# Patient Record
(demographics unavailable — no encounter records)

---

## 2024-11-01 NOTE — REASON FOR VISIT
[Home] : at home, [unfilled] , at the time of the visit. [Medical Office: ([facility])___] : at the medical office located in  [Patient] : the patient [Self] : self [Consultation] : a consultation visit

## 2024-11-01 NOTE — REASON FOR VISIT
[Home] : at home, [unfilled] , at the time of the visit. [Medical Office: ([facility])___] : at the medical office located in  [Patient] : the patient [Self] : self [Consultation] : a consultation visit

## 2024-11-01 NOTE — REASON FOR VISIT
[Home] : at home, [unfilled] , at the time of the visit. [Medical Office: ([facility])___] : at the medical office located in  [Patient] : the patient [Self] : self [Consultation] : a consultation visit

## 2024-11-04 NOTE — ASSESSMENT
[FreeTextEntry1] : I reviewed all her records.  At this stage I do believe the fact that she needs to be evaluated here with a MRI of the pelvis, Gastrografin enema and exam under anesthesia with flexible pouchoscopy to assess what recommendation I can give it to her.  Her #1 problem is stoma related issues with leakage where she changes every day.  At the same time she is also very interested about the possibility of a redo pouch.  She was told this was not a possibility and she had to have a en bloc pouch and uterus removal.  I do not think this is really needed.  At the same time I cannot be sure.  We will also have her to see Dr. Dodson (if she needs a redo pouch) when she is here to see our physician assistants before the surgery.  Risk benefits morbidity were explained she consents for the procedure. -GGE, MRI pelvis, and EUA/pouchoscopy for initial workup -Meet with stoma nurse while here in [place]
normal

## 2024-11-04 NOTE — HISTORY OF PRESENT ILLNESS
[Home] : at home, [unfilled] , at the time of the visit. 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 Cameron. She developed pouchitis "almost right away that was off and on" and developed a perirectal abscess 10 years after pouch surgery. Ex-lap in 2012  She states that 3 years ago she started having abdominal pain and went to ED and OBGYN. She had fibroids removed. She got an MRI and states that "it looked like I had dehiscence of j-pouch and they drained the pus in my pouch" She met with Dr. Mendez at . She was given an end ileostomy, but she still has an anus and pouch.  Her post op course was complicated by pouchitis requiring antibiotics ad recurrent perianal abscesses and fistulas. recent hospitalization at  for chronic leak from tip of the J  Her QOL is impacted by her ileostomy. She states that "the mouth is turned downward and leaks". She changes her bag every day or every other day.   She would like to entertain the idea of having a j-pouch redo.   5/10/2024 - Dx Lx MARIELENA, takedown small bowel fistula x 2, repair of top of J, small bowel resection, DLI (Dr. Corral) (pg 59/60) *Remains small bowel 400 cm total remaining with 275 proximal to the small bowel anastomosis and 125 distal to the small bowel anastomosis. DLI about 15cm proximal to the pouch  Surgical Pathology (05/10/24) - page 162/198 FINAL DIAGNOSIS A. Small bowel resection:  -segment of small bowel with chronic active enteritis, fistula tract, and acute and organizing serositis.  -negative for granulomatous inflammation and dysplasia  Medications Wellbutrin and Trazadone daily

## 2024-11-04 NOTE — PHYSICAL EXAM
[Alert] : alert [Oriented to Person] : oriented to person [Oriented to Place] : oriented to place [Oriented to Time] : oriented to time [Calm] : calm [de-identified] : +stoma [de-identified] : WDWEMMANUEL [de-identified] : ANICTERIC

## 2024-11-04 NOTE — HISTORY OF PRESENT ILLNESS
[Home] : at home, [unfilled] , at the time of the visit. 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 Cameron. She developed pouchitis "almost right away that was off and on" and developed a perirectal abscess 10 years after pouch surgery. Ex-lap in 2012  She states that 3 years ago she started having abdominal pain and went to ED and OBGYN. She had fibroids removed. She got an MRI and states that "it looked like I had dehiscence of j-pouch and they drained the pus in my pouch" She met with Dr. Mendez at . She was given an end ileostomy, but she still has an anus and pouch.  Her post op course was complicated by pouchitis requiring antibiotics ad recurrent perianal abscesses and fistulas. recent hospitalization at  for chronic leak from tip of the J  Her QOL is impacted by her ileostomy. She states that "the mouth is turned downward and leaks". She changes her bag every day or every other day.   She would like to entertain the idea of having a j-pouch redo.   5/10/2024 - Dx Lx MARIELENA, takedown small bowel fistula x 2, repair of top of J, small bowel resection, DLI (Dr. Corral) (pg 59/60) *Remains small bowel 400 cm total remaining with 275 proximal to the small bowel anastomosis and 125 distal to the small bowel anastomosis. DLI about 15cm proximal to the pouch  Surgical Pathology (05/10/24) - page 162/198 FINAL DIAGNOSIS A. Small bowel resection:  -segment of small bowel with chronic active enteritis, fistula tract, and acute and organizing serositis.  -negative for granulomatous inflammation and dysplasia  Medications Wellbutrin and Trazadone daily

## 2024-11-04 NOTE — PHYSICAL EXAM
[Alert] : alert [Oriented to Person] : oriented to person [Oriented to Place] : oriented to place [Oriented to Time] : oriented to time [Calm] : calm [de-identified] : +stoma [de-identified] : WDWEMMANUEL [de-identified] : ANICTERIC

## 2024-11-04 NOTE — PHYSICAL EXAM
[Alert] : alert [Oriented to Person] : oriented to person [Oriented to Place] : oriented to place [Oriented to Time] : oriented to time [Calm] : calm [de-identified] : +stoma [de-identified] : WDWEMMANUEL [de-identified] : ANICTERIC

## 2024-11-04 NOTE — HISTORY OF PRESENT ILLNESS
[Home] : at home, [unfilled] , at the time of the visit. 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 Cameron. She developed pouchitis "almost right away that was off and on" and developed a perirectal abscess 10 years after pouch surgery. Ex-lap in 2012  She states that 3 years ago she started having abdominal pain and went to ED and OBGYN. She had fibroids removed. She got an MRI and states that "it looked like I had dehiscence of j-pouch and they drained the pus in my pouch" She met with Dr. Mendez at . She was given an end ileostomy, but she still has an anus and pouch.  Her post op course was complicated by pouchitis requiring antibiotics ad recurrent perianal abscesses and fistulas. recent hospitalization at  for chronic leak from tip of the J  Her QOL is impacted by her ileostomy. She states that "the mouth is turned downward and leaks". She changes her bag every day or every other day.   She would like to entertain the idea of having a j-pouch redo.   5/10/2024 - Dx Lx MARIELENA, takedown small bowel fistula x 2, repair of top of J, small bowel resection, DLI (Dr. Corral) (pg 59/60) *Remains small bowel 400 cm total remaining with 275 proximal to the small bowel anastomosis and 125 distal to the small bowel anastomosis. DLI about 15cm proximal to the pouch  Surgical Pathology (05/10/24) - page 162/198 FINAL DIAGNOSIS A. Small bowel resection:  -segment of small bowel with chronic active enteritis, fistula tract, and acute and organizing serositis.  -negative for granulomatous inflammation and dysplasia  Medications Wellbutrin and Trazadone daily

## 2024-12-03 NOTE — HISTORY OF PRESENT ILLNESS
[FreeTextEntry1] : The patient is a 61 y/o F from [place] here today with her sister, Josiane for pre-op discussion for j-pouch excision  She states that during previous hospitalization   Her 4 month old grandson, Ashkan recently had heart surgery and is doing well.

## 2024-12-03 NOTE — ASSESSMENT
[FreeTextEntry1] : reviewed ucath  purpose to help identify ureters avoids injury and recognize injury if occurs  risk of bleeding infection damage to ureter  poss need for replacemnt

## 2024-12-03 NOTE — ASSESSMENT
[FreeTextEntry1] : -Discussed stents/epidural -Reviewed instructions for pre-surgical shake -Discussed low fiber 4-6 weeks post op -Discussed lifting restrictions 4-6 weeks post op -F/U with RD after surgery -Risks, benefits, and alternatives discussed with patient. Patient consents to surgery.  ELY

## 2024-12-03 NOTE — HISTORY OF PRESENT ILLNESS
[FreeTextEntry1] : The patient-doctor relationship has been established in a face to face fashion via real time video/audio HIPAA compliant communication using telemedicine software.  The patient's identity has been confirmed.  The patient was previously emailed a copy of the telemedicine consent.  They have had a chance to review and has now given verbal consent and has requested care to be assessed and treated through telemedicine and understands there maybe limitations in this process and they may need further follow up care in the office and or hospital settings.      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 Cameron. She developed pouchitis "almost right away that was off and on" and developed a perirectal abscess 10 years after pouch surgery. Ex-lap in 2012  She states that 3 years ago she started having abdominal pain and went to ED and OBGYN. She had fibroids removed. She got an MRI and states that "it looked like I had dehiscence of j-pouch and they drained the pus in my pouch" She met with Dr. Mendez at . She was given an end ileostomy, but she still has an anus and pouch. Her post op course was complicated by pouchitis requiring antibiotics ad recurrent perianal abscesses and fistulas. recent hospitalization at  for chronic leak from tip of the J  scheduked for pouch excision  consult for ucaths no voiding complaints  no prior gu surgery

## 2024-12-03 NOTE — HISTORY OF PRESENT ILLNESS
[FreeTextEntry1] : The patient-doctor relationship has been established in a face to face fashion via real time video/audio HIPAA compliant communication using telemedicine software.  The patient's identity has been confirmed.  The patient was previously emailed a copy of the telemedicine consent.  They have had a chance to review and has now given verbal consent and has requested care to be assessed and treated through telemedicine and understands there maybe limitations in this process and they may need further follow up care in the office and or hospital settings.      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 Cameron. She developed pouchitis "almost right away that was off and on" and developed a perirectal abscess 10 years after pouch surgery. Ex-lap in 2012  She states that 3 years ago she started having abdominal pain and went to ED and OBGYN. She had fibroids removed. She got an MRI and states that "it looked like I had dehiscence of j-pouch and they drained the pus in my pouch" She met with Dr. Mendez at . She was given an end ileostomy, but she still has an anus and pouch. Her post op course was complicated by pouchitis requiring antibiotics ad recurrent perianal abscesses and fistulas. recent hospitalization at  for chronic leak from tip of the J  scheduked for pouch excision  consult for ucaths no voiding complaints  no prior gu surgery

## 2025-01-03 NOTE — DATA REVIEWED
[FreeTextEntry1] : Final Diagnosis Small bowel pouch afferent limb and anus - Compatible with pouch with intact anastomoses, focal defect area without associated inflammatory reaction, and anus - Pouch featuring chronic inflammatory infiltrates, crypt distortion, acute inflammation, and erosion - Viable resection  margins; scattered crypt abscesses present at one resection margin - Negative for granulomas and neoplasm - Seven reactive lymph nodes Verified by: Theodore Eagle DO

## 2025-01-03 NOTE — PHYSICAL EXAM
[No Rash or Lesion] : No rash or lesion [Alert] : alert [Oriented to Person] : oriented to person [Oriented to Place] : oriented to place [Calm] : calm [de-identified] : +stoma [de-identified] : wdwn female, nad [de-identified] : nc/at [facility] [de-identified] : no c/c/e noted

## 2025-01-03 NOTE — HISTORY OF PRESENT ILLNESS
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 Her post op course was complicated by pouchitis requiring antibiotcs ad recurrent perianal abscesses and fistulas. recent hospitalization at  for chronic leak from tip of the J  5/10/2024 - Dx Lx MARIELENA, takedown small bowel fistula x 2, repair of top of J, small bowel resection, DLI (Dr. Corral) (pg 59/60) *Remains small bowel 400 cm total remaining with 275 proximal to the small bowel anastomosis and 125 distal to the small bowel anastomosis. DLI about 15cm proximal to the pouch  1/2/2025 She still has some pain sitting.  She did some pictures showing some opening of her wound. No fever. occasional chills. She is using tylenol and advil prn for her pain. she is taking pepcid with the ibuprofen.Her symptoms are worse at night. She does not want to take pain medication.  She did drive to the store and she is gets very tired.  She is maintaining her hydration. Her ostomy is functioning, she is eating protein. She had some pancaking - but overall doing ok. Issues are much better than previously.  She is having night sweats - sometimes. She does not feel that she has an infection.

## 2025-01-03 NOTE — ASSESSMENT
[FreeTextEntry1] : 61 yo female with hx of pouch excision doing well, with mild wound dehiscence - serosanginous drainage will follow up with referring CRS for in person wound check ok to take oxycodone prn in the evening for pain. Follow up with Dr. Ferguson

## 2025-01-03 NOTE — PHYSICAL EXAM
[No Rash or Lesion] : No rash or lesion [Alert] : alert [Oriented to Person] : oriented to person [Oriented to Place] : oriented to place [Calm] : calm [de-identified] : +stoma [de-identified] : wdwn female, nad [de-identified] : nc/at [facility] [de-identified] : no c/c/e noted

## 2025-02-07 NOTE — REASON FOR VISIT
[Home] : at home, [unfilled] , at the time of the visit. [Medical Office: ([facility])___] : at the medical office located in  [Telehealth (audio & video)] : This visit was provided via telehealth using real-time 2-way audio visual technology. [Verbal consent obtained from patient] : the patient, [unfilled] [Follow-Up: _____] : a [unfilled] follow-up visit

## 2025-02-07 NOTE — REASON FOR VISIT
[Home] : at home, [unfilled] , at the time of the visit. [Medical Office: ([facility])___] : at the medical office located in  [Telehealth (audio & video)] : This visit was provided via telehealth using real-time 2-way audio visual technology. [Verbal consent obtained from patient] : the patient, [unfilled] [Follow-Up: _____] : a [unfilled] follow-up visit

## 2025-02-10 NOTE — PHYSICAL EXAM
[Alert] : alert [Oriented to Person] : oriented to person [Oriented to Place] : oriented to place [Oriented to Time] : oriented to time [Calm] : calm [de-identified] : +stoma [de-identified] : WDWEMMANUEL [de-identified] : ANICTERIC

## 2025-02-10 NOTE — HISTORY OF PRESENT ILLNESS
[Home] : at home, [unfilled] , at the time of the visit. 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 Cameron. She developed pouchitis "almost right away that was off and on" and developed a perirectal abscess 10 years after pouch surgery. Ex-lap in 2012  She states that 3 years ago she started having abdominal pain and went to ED and OBGYN. She had fibroids removed. She got an MRI and states that "it looked like I had dehiscence of j-pouch and they drained the pus in my pouch" She met with Dr. Mendez at . She was given an end ileostomy, but she still has an anus and pouch.  Her post op course was complicated by pouchitis requiring antibiotics ad recurrent perianal abscesses and fistulas. recent hospitalization at  for chronic leak from tip of the J  Her QOL is impacted by her ileostomy. She states that "the mouth is turned downward and leaks". She changes her bag every day or every other day.   She would like to entertain the idea of having a j-pouch redo.   5/10/2024 - Dx Lx MARIELENA, takedown small bowel fistula x 2, repair of top of J, small bowel resection, DLI (Dr. Corral) (pg 59/60) *Remains small bowel 400 cm total remaining with 275 proximal to the small bowel anastomosis and 125 distal to the small bowel anastomosis. DLI about 15cm proximal to the pouch  Surgical Pathology (05/10/24) - page 162/198 FINAL DIAGNOSIS A. Small bowel resection:  -segment of small bowel with chronic active enteritis, fistula tract, and acute and organizing serositis.  -negative for granulomatous inflammation and dysplasia  Medications Wellbutrin and Trazadone daily  2/10/2025 Here for f/u appt s/p j-pouch excision and stoma revision 12/04/24 She states that she has a small portion of her perineal wound that is still healing.  LLQ pain that is "very low and pretty tender" She has had some pain with sexual intercourse. This was the first time she attempted intercourse in almost 2 years She is postmenopausal.  Denies fever, chills She is back at work

## 2025-02-10 NOTE — PHYSICAL EXAM
[Alert] : alert [Oriented to Person] : oriented to person [Oriented to Place] : oriented to place [Oriented to Time] : oriented to time [Calm] : calm [de-identified] : +stoma [de-identified] : WDWEMMANUEL [de-identified] : ANICTERIC

## 2025-02-10 NOTE — ASSESSMENT
[FreeTextEntry1] : I reviewed all her records.  At this stage I do believe the fact that she needs to be evaluated here with a MRI of the pelvis, Gastrografin enema and exam under anesthesia with flexible pouchoscopy to assess what recommendation I can give it to her.  Her #1 problem is stoma related issues with leakage where she changes every day.  At the same time she is also very interested about the possibility of a redo pouch.  She was told this was not a possibility and she had to have a en bloc pouch and uterus removal.  I do not think this is really needed.  At the same time I cannot be sure.  We will also have her to see Dr. Dodson (if she needs a redo pouch) when she is here to see our physician assistants before the surgery.  Risk benefits morbidity were explained she consents for the procedure. -GGE, MRI pelvis, and EUA/pouchoscopy for initial workup -Meet with stoma nurse while here in [place]  2/10/2025 This is a well-known patient of mine who underwent through a pouch excision.  Answered questions about the fistula where the removal of the anus on the pouch addresses this problem.  She is still having some left pain some issues with a difficult and painful intercourse which instructed her to see her gynecologist for postmenopausal issues.  If the issues still there and she is still having some perianal wound issues I instructed her to come to see us here in 2 to 3 months or follow-up locally with my guidance.  At this stage she is very happy with the outcome of her surgery and she is going to let us know if she has any problems whether she needs a exam under anesthesia in 2 to 3 months. -address postmenopausal symptoms -she is going to send updated picture of perineal wound in 2 weeks -F/U video visit with Gill in 12 weeks -If the pt still experiencing drainage from perineal wound in 8-10 weeks, pt should let us know. Will need EUA here or locally -Op report will be sent to pt

## 2025-02-10 NOTE — HISTORY OF PRESENT ILLNESS
[Home] : at home, [unfilled] , at the time of the visit. 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 [place] by Dr. Cameron. She developed pouchitis "almost right away that was off and on" and developed a perirectal abscess 10 years after pouch surgery. Ex-lap in 2012  She states that 3 years ago she started having abdominal pain and went to ED and OBGYN. She had fibroids removed. She got an MRI and states that "it looked like I had dehiscence of j-pouch and they drained the pus in my pouch" She met with Dr. Mendez at . She was given an end ileostomy, but she still has an anus and pouch.  Her post op course was complicated by pouchitis requiring antibiotics ad recurrent perianal abscesses and fistulas. recent hospitalization at  for chronic leak from tip of the J  Her QOL is impacted by her ileostomy. She states that "the mouth is turned downward and leaks". She changes her bag every day or every other day.   She would like to entertain the idea of having a j-pouch redo.   5/10/2024 - Dx Lx MARIELENA, takedown small bowel fistula x 2, repair of top of J, small bowel resection, DLI (Dr. Corral) (pg 59/60) *Remains small bowel 400 cm total remaining with 275 proximal to the small bowel anastomosis and 125 distal to the small bowel anastomosis. DLI about 15cm proximal to the pouch  Surgical Pathology (05/10/24) - page 162/198 FINAL DIAGNOSIS A. Small bowel resection:  -segment of small bowel with chronic active enteritis, fistula tract, and acute and organizing serositis.  -negative for granulomatous inflammation and dysplasia  Medications Wellbutrin and Trazadone daily  2/10/2025 Here for f/u appt s/p j-pouch excision and stoma revision 12/04/24 She states that she has a small portion of her perineal wound that is still healing.  LLQ pain that is "very low and pretty tender" She has had some pain with sexual intercourse. This was the first time she attempted intercourse in almost 2 years She is postmenopausal.  Denies fever, chills She is back at work